# Patient Record
Sex: FEMALE | Race: WHITE | NOT HISPANIC OR LATINO | URBAN - METROPOLITAN AREA
[De-identification: names, ages, dates, MRNs, and addresses within clinical notes are randomized per-mention and may not be internally consistent; named-entity substitution may affect disease eponyms.]

---

## 2019-08-02 ENCOUNTER — APPOINTMENT (RX ONLY)
Dept: URBAN - METROPOLITAN AREA CLINIC 87 | Facility: CLINIC | Age: 64
Setting detail: DERMATOLOGY
End: 2019-08-02

## 2019-08-02 VITALS — SYSTOLIC BLOOD PRESSURE: 103 MMHG | DIASTOLIC BLOOD PRESSURE: 66 MMHG | HEART RATE: 76 BPM

## 2019-08-02 PROBLEM — C44.319 BASAL CELL CARCINOMA OF SKIN OF OTHER PARTS OF FACE: Status: ACTIVE | Noted: 2019-08-02

## 2019-08-02 PROBLEM — E03.9 HYPOTHYROIDISM, UNSPECIFIED: Status: ACTIVE | Noted: 2019-08-02

## 2019-08-02 PROBLEM — C18.9 MALIGNANT NEOPLASM OF COLON, UNSPECIFIED: Status: ACTIVE | Noted: 2019-08-02

## 2019-08-02 PROBLEM — M12.9 ARTHROPATHY, UNSPECIFIED: Status: ACTIVE | Noted: 2019-08-02

## 2019-08-02 PROBLEM — I10 ESSENTIAL (PRIMARY) HYPERTENSION: Status: ACTIVE | Noted: 2019-08-02

## 2019-08-02 PROCEDURE — 17311 MOHS 1 STAGE H/N/HF/G: CPT

## 2019-08-02 PROCEDURE — 13132 CMPLX RPR F/C/C/M/N/AX/G/H/F: CPT

## 2019-08-02 PROCEDURE — ? MOHS SURGERY

## 2019-08-02 PROCEDURE — 17312 MOHS ADDL STAGE: CPT

## 2019-08-02 PROCEDURE — ? REPAIR NOTE

## 2019-08-02 NOTE — PROCEDURE: MOHS SURGERY
Referred To Mid-Level For Closure Text (Leave Blank If You Do Not Want): After obtaining clear surgical margins the patient was sent to Kit Jennings PA-C for surgical repair.  The patient understands they will receive post-surgical care and follow-up from the mid-level provider.

## 2019-08-02 NOTE — PROCEDURE: MOHS SURGERY
Error per patient    Mid-Level Procedure Text (B): After obtaining clear surgical margins the patient was sent to a mid-level provider for surgical repair.  The patient understands they will receive post-surgical care and follow-up from the mid-level provider.

## 2024-01-11 NOTE — PROCEDURE: REPAIR NOTE
1. Follow up with your primary care physician within 2-3days for reevaluation.  2.  Return to the Emergency Department immediately for any worsening, progressive or concerning symptoms.     Reacción anafiláctica, en adultos  Anaphylactic Reaction, Adult  Faizan reacción anafiláctica (anafilaxia) es faizan reacción alérgica repentina e intensa. Se debe tratar a la persona de inmediato. Deberá ir a la renetta de emergencias.    ¿Cuáles son las causas?  Star tipo de reacción ocurre cuando mendoza se expone a algo a lo que es alérgico (alérgeno). El cuerpo produce anticuerpos para combatir el alérgeno. También produce un compuesto llamado “histamina”. [place] hace que algunas partes del cuerpo se hinchen. También puede causar la pérdida de la presión arterial en órganos ester el corazón y los pulmones.    Los alérgenos que pueden causar star tipo de reacción incluyen los siguientes:  Alimentos ester maníes, gabriela, mariscos, leche o huevos.  Medicamentos.  Las picaduras o mordeduras de insectos.  La paty o parte de la paty que se recibe ester tratamiento (transfusiones).  Productos químicos. Estos incluyen tintes, látex y el material de contraste que se usa para algunos estudios médicos.  ¿Qué incrementa el riesgo?  Es más probable que tenga star tipo de reacción si usted:  Tiene alergias.  Tuvo faizan reacción anafiláctica antes.  Tiene antecedentes familiares de reacción anafiláctica.  Tiene determinadas afecciones. Entre ellas, asma y eccema.  ¿Cuáles son los signos o síntomas?  Los síntomas de esta afección pueden incluir los siguientes:  Sensación de calor en la pravin (rubor). El marcel puede tornarse de color rogers.  Ronchas. Las ronchas son áreas de la piel hinchadas, munroe y que pican.  Hinchazón de los ojos, los labios, la pravin, la lengua, la boca o la garganta.  Problemas para respirar, hablar o tragar.  Emitir sonidos de silbidos agudos al respirar, más a menudo al exhalar (sibilancias).  Mareos o sensación de desvanecimiento o desmayo.  Dolor o cólicos en el abdomen.  Vómitos o diarrea.  ¿Cómo se diagnostica?  Esta afección se diagnostica en función de lo siguiente:  Los síntomas.  Un examen físico.  Análisis de paty.  Cuándo fue la última vez que se expuso a un alérgeno.  ¿Cómo se trata?  A person using an epinephrine auto-injector in the thigh.  Si robert que tiene faizan reacción anafiláctica, usted debe:  Aplicarse faizan inyección de emergencia de epinefrina utilizando un dispositivo que tiene medicamento (lápiz autoinyector). El médico le enseñará cómo usar el lápiz autoinyector.  Pedir ayuda. Si usa el lápiz autoinyector, igual debe recibir tratamiento en el hospital. Es posible que le administren lo siguiente:  Medicamentos para ayudar a:  Contraer los vasos sanguíneos (epinefrina).  Aliviar la picazón y la urticaria (antihistamínicos).  Reducir la hinchazón (corticoesteroides).  Oxigenoterapia para ayudarlo a que respire.  Líquidos intravenosos (i.v.) para que el cuerpo tenga agua suficiente.  Siga estas instrucciones en lopez casa:  Seguridad    Siempre tenga a mano un lápiz autoinyector. Úselo ester se lo haya indicado el médico.  No conduzca después de tener faizan reacción anafiláctica. Espere hasta que el médico le diga que puede hacerlo.  Asegúrese de que usted, las personas que viven en lopez hogar y lopez empleador sepan:  A qué es alérgico.  Cómo usar un lápiz autoinyector.  Reemplace la epinefrina inmediatamente después de usar el lápiz autoinyector. Si puede, lleve dos lápices consigo.  Si se lo indica el médico, lleve un brazalete o collar de alerta que informe sobre lopez alergia.  Aprenda cuáles son los signos de faizan reacción anafiláctica.  Trabaje con lopez equipo de atención médica para elaborar un plan de manejo de reacción anafiláctica.  Instrucciones generales    Use los medicamentos de venta becca y los recetados solamente ester se lo haya indicado el médico.  Si tiene urticaria o faizan erupción cutánea:  Use un medicamento de venta becca para la alergia (antihistamínico) ester se lo haya indicado el médico.  Aplíquese paños fríos y húmedos (compresas frías) sobre la piel. [place] o duchas de agua fría. No use [place].  Informe a lopez equipo que tiene faizan alergia.  ¿Cómo se previene?  Evite los alérgenos.  Cuando vaya a un restaurante, informe al camarero que tiene faizan alergia. Si no tiene la certeza de si un plato del menú contiene un alimento al cual tiene alergia, pregúntele al camarero.  Dónde obtener más información  American Academy of Allergy, Asthma and Immunology (AAAAI) (Academia Estadounidense de Alergia, Asma e Inmunología): aaaai.org  Comuníquese con un médico si:  El lápiz autoinyector que tiene ha vencido.  Solicite ayuda de inmediato si:  Tiene síntomas de faizan reacción alérgica.  Usa un lápiz autoinyector. Necesitará más atención médica incluso si el medicamento parece estar actuando. La reacción anafiláctica puede suceder otra vez en el transcurso de 72 horas (anafilaxia de rebote).  Estos síntomas pueden indicar faizan emergencia. Use el lápiz autoinyector de inmediato. Luego llame al 911.  No espere a thong si los síntomas desaparecen.  No conduzca por raymon propios medios hasta el hospital.  Esta información no tiene ester fin reemplazar el consejo del médico. Asegúrese de hacerle al médico cualquier pregunta que tenga. 1. Follow up with your primary care physician within 2-3days for reevaluation.  2.  Return to the Emergency Department immediately for any worsening, progressive or concerning symptoms.     Reacción anafiláctica, en adultos  Anaphylactic Reaction, Adult  Faizan reacción anafiláctica (anafilaxia) es faizan reacción alérgica repentina e intensa. Se debe tratar a la persona de inmediato. Deberá ir a la renetta de emergencias.    ¿Cuáles son las causas?  Star tipo de reacción ocurre cuando mendoza se expone a algo a lo que es alérgico (alérgeno). El cuerpo produce anticuerpos para combatir el alérgeno. También produce un compuesto llamado “histamina”. [place] hace que algunas partes del cuerpo se hinchen. También puede causar la pérdida de la presión arterial en órganos ester el corazón y los pulmones.    Los alérgenos que pueden causar star tipo de reacción incluyen los siguientes:  Alimentos ester maníes, gabriela, mariscos, leche o huevos.  Medicamentos.  Las picaduras o mordeduras de insectos.  La paty o parte de la paty que se recibe ester tratamiento (transfusiones).  Productos químicos. Estos incluyen tintes, látex y el material de contraste que se usa para algunos estudios médicos.  ¿Qué incrementa el riesgo?  Es más probable que tenga star tipo de reacción si usted:  Tiene alergias.  Tuvo faizan reacción anafiláctica antes.  Tiene antecedentes familiares de reacción anafiláctica.  Tiene determinadas afecciones. Entre ellas, asma y eccema.  ¿Cuáles son los signos o síntomas?  Los síntomas de esta afección pueden incluir los siguientes:  Sensación de calor en la pravin (rubor). El marcel puede tornarse de color rogers.  Ronchas. Las ronchas son áreas de la piel hinchadas, [place] y que pican.  Hinchazón de los ojos, los labios, la pravin, la lengua, la boca o la garganta.  Problemas para respirar, hablar o tragar.  Emitir sonidos de silbidos agudos al respirar, más a menudo al exhalar (sibilancias).  Mareos o sensación de desvanecimiento o desmayo.  Dolor o cólicos en el abdomen.  Vómitos o diarrea.  ¿Cómo se diagnostica?  Esta afección se diagnostica en función de lo siguiente:  Los síntomas.  Un examen físico.  Análisis de paty.  Cuándo fue la última vez que se expuso a un alérgeno.  ¿Cómo se trata?  A person using an epinephrine auto-injector in the thigh.  Si robert que tiene faizan reacción anafiláctica, usted debe:  Aplicarse faizan inyección de emergencia de epinefrina utilizando un dispositivo que tiene medicamento (lápiz autoinyector). El médico le enseñará cómo usar el lápiz autoinyector.  Pedir ayuda. Si usa el lápiz autoinyector, igual debe recibir tratamiento en el hospital. Es posible que le administren lo siguiente:  Medicamentos para ayudar a:  Contraer los vasos sanguíneos (epinefrina).  Aliviar la picazón y la urticaria (antihistamínicos).  Reducir la hinchazón (corticoesteroides).  Oxigenoterapia para ayudarlo a que respire.  Líquidos intravenosos (i.v.) para que el cuerpo tenga agua suficiente.  Siga estas instrucciones en lopez casa:  Seguridad    Siempre tenga a mano un lápiz autoinyector. Úselo ester se lo haya indicado el médico.  No conduzca después de tener faizan reacción anafiláctica. Espere hasta que el médico le diga que puede hacerlo.  Asegúrese de que usted, las personas que viven en lopez hogar y lopez empleador sepan:  A qué es alérgico.  Cómo usar un lápiz autoinyector.  Reemplace la epinefrina inmediatamente después de usar el lápiz autoinyector. Si puede, lleve dos lápices consigo.  Si se lo indica el médico, lleve un brazalete o collar de alerta que informe sobre lopez alergia.  Aprenda cuáles son los signos de faizan reacción anafiláctica.  Trabaje con lopez equipo de atención médica para elaborar un plan de manejo de reacción anafiláctica.  Instrucciones generales    Use los medicamentos de venta becca y los recetados solamente ester se lo haya indicado el médico.  Si tiene urticaria o faizan erupción cutánea:  Use un medicamento de venta becca para la alergia (antihistamínico) ester se lo haya indicado el médico.  Aplíquese paños fríos y húmedos (compresas frías) sobre la piel. [place] ghanshyam o duchas de agua fría. No use [place].  Informe a lopez equipo que tiene faizan alergia.  ¿Cómo se previene?  Evite los alérgenos.  Cuando vaya a un restaurante, informe al camarero que tiene faizan alergia. Si no tiene la certeza de si un plato del menú contiene un alimento al cual tiene alergia, pregúntele al camarero.  Dónde obtener más información  American Academy of Allergy, Asthma and Immunology (AAAAI) (Academia Estadounidense de Alergia, Asma e Inmunología): aaaai.org  Comuníquese con un médico si:  El lápiz autoinyector que tiene ha vencido.  Solicite ayuda de inmediato si:  Tiene síntomas de faizan reacción alérgica.  Usa un lápiz autoinyector. Necesitará más atención médica incluso si el medicamento parece estar actuando. La reacción anafiláctica puede suceder otra vez en el transcurso de 72 horas (anafilaxia de rebote).  Estos síntomas pueden indicar faizan emergencia. Use el lápiz autoinyector de inmediato. Luego llame al 911.  No espere a thong si los síntomas desaparecen.  No conduzca por raymon propios medios hasta el hospital.  Esta información no tiene ester fin reemplazar el consejo del médico. Asegúrese de hacerle al médico cualquier pregunta que tenga. Posterior Auricular Interpolation Flap Text: A decision was made to reconstruct the defect utilizing an interpolation axial flap and a staged reconstruction.  A telfa template was made of the defect.  This telfa template was then used to outline the posterior auricular interpolation flap.  The donor area for the pedicle flap was then injected with anesthesia.  The flap was excised through the skin and subcutaneous tissue down to the layer of the underlying musculature.  The pedicle flap was carefully excised within this deep plane to maintain its blood supply.  The edges of the donor site were undermined.   The donor site was closed in a primary fashion.  The pedicle was then rotated into position and sutured.  Once the tube was sutured into place, adequate blood supply was confirmed with blanching and refill.  The pedicle was then wrapped with xeroform gauze and dressed appropriately with a telfa and gauze bandage to ensure continued blood supply and protect the attached pedicle.

## 2024-05-09 NOTE — PROCEDURE: REPAIR NOTE
[facility] [address] [address]  Phone: [phone]  Fax: [phone]  [facility]: [email or web address]    Pt Name: Erick Doyle II  Injury Date: 05/09/2024   Employee ID:  Date of First Treatment: 05/09/2024   Company: Networked reference to record EEP [id number][employer] [employer]      Appointment Time: 06:10 PM Arrived: 1828   Provider: Sierra Dawn NP Time Out:1923     Office Treatment:  Advised on Tylenol and ibuprofen over-the-counter, can try lidocaine patch.  Follow-up tomorrow with occupational health.  1. Encounter related to worker's compensation claim    2. Strain of lumbar region, initial encounter                     Return Appointment: Visit date not found at 5/10/2024          Suturegard Retention Suture: 2-0 Nylon

## 2024-07-17 NOTE — PROCEDURE: REPAIR NOTE
Double M-Plasty Complex Repair Preamble Text (Leave Blank If You Do Not Want): Extensive wide undermining was performed. fair minus

## 2025-01-23 NOTE — PROCEDURE: MOHS SURGERY
For information on Fall & Injury Prevention, visit: [email or web address] OR  [email or web address] OR  https://www.cdc.gov/steadi/patient.html Referred To Plastics For Closure Text (Leave Blank If You Do Not Want): After obtaining clear surgical margins the patient was sent to plastics for surgical repair.  The patient understands they will receive post-surgical care and follow-up from Dr. Barba.